# Patient Record
Sex: FEMALE | Race: WHITE | NOT HISPANIC OR LATINO | Employment: UNEMPLOYED | URBAN - NONMETROPOLITAN AREA
[De-identification: names, ages, dates, MRNs, and addresses within clinical notes are randomized per-mention and may not be internally consistent; named-entity substitution may affect disease eponyms.]

---

## 2018-08-13 ENCOUNTER — APPOINTMENT (EMERGENCY)
Dept: CT IMAGING | Facility: HOSPITAL | Age: 28
End: 2018-08-13
Payer: COMMERCIAL

## 2018-08-13 ENCOUNTER — HOSPITAL ENCOUNTER (EMERGENCY)
Facility: HOSPITAL | Age: 28
Discharge: HOME | End: 2018-08-13
Attending: EMERGENCY MEDICINE
Payer: COMMERCIAL

## 2018-08-13 VITALS
HEART RATE: 100 BPM | SYSTOLIC BLOOD PRESSURE: 132 MMHG | OXYGEN SATURATION: 100 % | WEIGHT: 171.3 LBS | TEMPERATURE: 97.2 F | RESPIRATION RATE: 18 BRPM | DIASTOLIC BLOOD PRESSURE: 75 MMHG

## 2018-08-13 DIAGNOSIS — R51.9 HEADACHE: ICD-10-CM

## 2018-08-13 DIAGNOSIS — K04.7 DENTAL ABSCESS: Primary | ICD-10-CM

## 2018-08-13 PROCEDURE — 99283 EMERGENCY DEPT VISIT LOW MDM: CPT

## 2018-08-13 PROCEDURE — 70450 CT HEAD/BRAIN W/O DYE: CPT

## 2018-08-13 RX ORDER — CLINDAMYCIN HYDROCHLORIDE 150 MG/1
300 CAPSULE ORAL ONCE
Status: COMPLETED | OUTPATIENT
Start: 2018-08-13 | End: 2018-08-13

## 2018-08-13 RX ORDER — ONDANSETRON 4 MG/1
4 TABLET, ORALLY DISINTEGRATING ORAL ONCE
Status: COMPLETED | OUTPATIENT
Start: 2018-08-13 | End: 2018-08-13

## 2018-08-13 RX ORDER — CLINDAMYCIN HYDROCHLORIDE 300 MG/1
300 CAPSULE ORAL
Qty: 28 CAPSULE | Refills: 0 | Status: SHIPPED | OUTPATIENT
Start: 2018-08-13 | End: 2018-08-20

## 2018-08-13 RX ORDER — NAPROXEN 250 MG/1
500 TABLET ORAL ONCE
Status: COMPLETED | OUTPATIENT
Start: 2018-08-13 | End: 2018-08-13

## 2018-08-13 RX ORDER — NAPROXEN 500 MG/1
500 TABLET ORAL EVERY 8 HOURS PRN
Qty: 20 TABLET | Refills: 0 | Status: SHIPPED | OUTPATIENT
Start: 2018-08-13

## 2018-08-13 RX ADMIN — ONDANSETRON 4 MG: 4 TABLET, ORALLY DISINTEGRATING ORAL at 15:39

## 2018-08-13 RX ADMIN — CLINDAMYCIN HYDROCHLORIDE 300 MG: 150 CAPSULE ORAL at 16:06

## 2018-08-13 RX ADMIN — NAPROXEN 500 MG: 250 TABLET ORAL at 16:06

## 2018-08-13 NOTE — ED PROVIDER NOTES
History  Chief Complaint   Patient presents with    Headache     pt had dental abcess few days ago which went down   now has headache and nausea     Pt gives hx of having "dental Abscess for few days" - has improved and decreased in size with no tx at home  Pt c/o continued discomfort and assoc nausea and HA on left side  Pt unsure about fevers  No difficulty swallowing or speaking  Tooth is decayed and broken- has not seen dentist          History provided by:  Patient  Headache   Pain location:  L temporal  Quality:  Sharp  Radiates to:  Does not radiate  Progression:  Waxing and waning  Chronicity:  New  Similar to prior headaches: no    Context: not stress, not exposure to cold air, not loud noise and not straining    Ineffective treatments:  NSAIDs  Associated symptoms: nausea and photophobia    Associated symptoms: no abdominal pain, no back pain, no blurred vision, no congestion, no cough, no diarrhea, no dizziness, no ear pain, no eye pain, no fever, no focal weakness, no hearing loss, no loss of balance, no near-syncope, no neck pain, no neck stiffness, no numbness, no paresthesias, no seizures, no sinus pressure, no sore throat, no swollen glands, no syncope, no tingling, no visual change and no vomiting    Dental Pain   Associated symptoms: headaches    Associated symptoms: no congestion, no drooling, no facial swelling, no fever and no neck pain        None       History reviewed  No pertinent past medical history  History reviewed  No pertinent surgical history  History reviewed  No pertinent family history  I have reviewed and agree with the history as documented  Social History   Substance Use Topics    Smoking status: Current Every Day Smoker    Smokeless tobacco: Never Used    Alcohol use Not on file      Comment: ocassional        Review of Systems   Constitutional: Negative  Negative for activity change, chills, diaphoresis and fever  HENT: Positive for dental problem  Negative for congestion, drooling, ear pain, facial swelling, hearing loss, sinus pressure, sore throat, trouble swallowing and voice change  Eyes: Positive for photophobia  Negative for blurred vision, pain, discharge and visual disturbance  Respiratory: Negative  Negative for cough, chest tightness, shortness of breath and stridor  Cardiovascular: Negative  Negative for chest pain, palpitations, syncope and near-syncope  Gastrointestinal: Positive for nausea  Negative for abdominal pain, diarrhea and vomiting  Genitourinary: Negative  Negative for difficulty urinating, dysuria, flank pain, hematuria and pelvic pain  Musculoskeletal: Negative  Negative for arthralgias, back pain, joint swelling, neck pain and neck stiffness  Skin: Negative  Negative for pallor, rash and wound  Allergic/Immunologic: Negative for immunocompromised state  Neurological: Positive for headaches  Negative for dizziness, tremors, focal weakness, seizures, speech difficulty, numbness, paresthesias and loss of balance  Psychiatric/Behavioral: Negative  All other systems reviewed and are negative  Physical Exam  Physical Exam   Constitutional: She is oriented to person, place, and time  She appears well-developed and well-nourished  She is active and cooperative  Non-toxic appearance  She does not have a sickly appearance  She does not appear ill  No distress  HENT:   Head: Normocephalic and atraumatic  Right Ear: Hearing, tympanic membrane and ear canal normal    Left Ear: Hearing, tympanic membrane and ear canal normal    Mouth/Throat: Oropharynx is clear and moist  Mucous membranes are not dry and not cyanotic  Dental abscesses and dental caries present  No oropharyngeal exudate, posterior oropharyngeal edema or posterior oropharyngeal erythema  Eyes: Conjunctivae and EOM are normal  Pupils are equal, round, and reactive to light  Neck: Normal range of motion  Neck supple  No JVD present   No spinous process tenderness and no muscular tenderness present  Cardiovascular: Normal rate, regular rhythm, intact distal pulses and normal pulses  No extrasystoles are present  Pulmonary/Chest: Effort normal  No stridor  No respiratory distress  She has no wheezes  She has no rhonchi  She has no rales  Abdominal: Soft  Bowel sounds are normal  There is no tenderness  There is no rigidity, no guarding and no CVA tenderness  Neurological: She is alert and oriented to person, place, and time  She has normal strength and normal reflexes  No cranial nerve deficit  Skin: Skin is warm and dry  No petechiae and no rash noted  She is not diaphoretic  No cyanosis  No pallor  Psychiatric: She has a normal mood and affect  Her speech is normal and behavior is normal  Thought content normal  Cognition and memory are normal    Vitals reviewed  Vital Signs  ED Triage Vitals [08/13/18 1517]   Temperature Pulse Respirations Blood Pressure SpO2   (!) 97 2 °F (36 2 °C) 100 18 132/75 100 %      Temp Source Heart Rate Source Patient Position - Orthostatic VS BP Location FiO2 (%)   Temporal Right Sitting Right arm --      Pain Score       6           Vitals:    08/13/18 1517   BP: 132/75   Pulse: 100   Patient Position - Orthostatic VS: Sitting       Visual Acuity      ED Medications  Medications   ondansetron (ZOFRAN-ODT) dispersible tablet 4 mg (4 mg Oral Given 8/13/18 1539)   naproxen (NAPROSYN) tablet 500 mg (500 mg Oral Given 8/13/18 1606)   clindamycin (CLEOCIN) capsule 300 mg (300 mg Oral Given 8/13/18 1606)       Diagnostic Studies  Results Reviewed     None                 CT head without contrast   Final Result by Kelin Andrade DO (08/13 1553)   No acute intracranial abnormality  Please note: The teeth were not imaged              Workstation performed: [id number]                    Procedures  Procedures       Phone Contacts  ED Phone Contact    ED Course  ED Course as of Aug 14 2338   Mon Aug 13, 2018   1555 CT with no acute findings    1556 Discussed dental abscess and need for dental care - pt states will call today                                MDM  CritCare Time    Disposition  Final diagnoses:   Dental abscess   Headache     Time reflects when diagnosis was documented in both MDM as applicable and the Disposition within this note     Time User Action Codes Description Comment    8/13/2018  3:57 PM Alfa Luda Add [K04 7] Dental abscess     8/13/2018  3:57 PM Alfa Lares Add [R51] Headache       ED Disposition     ED Disposition Condition Comment    Discharge  Inell Kermit discharge to home/self care  Condition at discharge: Stable        Follow-up Information    None         Discharge Medication List as of 8/13/2018  4:02 PM      START taking these medications    Details   clindamycin (CLEOCIN) 300 MG capsule Take 1 capsule (300 mg total) by mouth 4 (four) times a day for 7 days, Starting Mon 8/13/2018, Until Mon 8/20/2018, Print      naproxen (NAPROSYN) 500 mg tablet Take 1 tablet (500 mg total) by mouth every 8 (eight) hours as needed for moderate pain, Starting Mon 8/13/2018, Print           No discharge procedures on file      ED Provider  Electronically Signed by           Naeem Wright DO  08/14/18 0268

## 2018-08-13 NOTE — DISCHARGE INSTRUCTIONS
Need to follow with dentist as discussed   Take medications as prescribed         Acute Headache   AMBULATORY CARE:   An acute headache  is pain or discomfort that starts suddenly and gets worse quickly  You may have an acute headache only when you feel stress or eat certain foods  Other acute headache pain can happen every day, and sometimes several times a day  The cause of an acute headache may not be known  It may be triggered by stress, fatigue, hormones, food, or trauma  Common types of acute headache:   · Tension headache  is the most common type of headache  These headaches typically occur in the late afternoon and go away by evening  The pain is usually mild or moderate  You may have problems tolerating bright light or loud noise  The pain is usually across the forehead or in the back of the head, often only on one side  These headaches may occur every day  · Migraine headaches  cause moderate or severe pain  The headache generally lasts from 1 to 3 days and tends to come back  Pain is usually on only one side, but it may change sides  Migraines often occur in the temple, the back of the head, or behind the eye  The pain may throb or be sharp and steady  · A migraine with aura  means you see or feel something before a migraine  You may see a small spot surrounded by bright zigzag lines  Other signs or symptoms may follow the aura  · Cluster headache  pain is usually only on one side  It often causes severe pain, and can last for 30 minutes to 2 hours  These headaches may occur 1 or 2 times each day, more often at night  The pain may wake you  Seek care immediately if:   · You have severe pain  · You have numbness or weakness on one side of your face or body  · You have a headache that occurs after a blow to the head, a fall, or other trauma  · You have a headache, are forgetful or confused, or have trouble speaking  · You have a headache, stiff neck, and a fever    Contact your healthcare provider if:   · You have a constant headache and are vomiting  · You have a headache each day that does not get better, even after treatment  · You have changes in your headaches, or new symptoms that occur when you have a headache  · You have questions or concerns about your condition or care  Treatment:   · Medicine  may be given to decrease pain  The medicine your healthcare provider recommends will depend on the kind of headaches you have  You will need to take prescription headache medicines as directed to prevent a problem called rebound headache  These headaches happen with regular use of pain relievers for headache disorders  NSAIDs or acetaminophen may help some kinds of headaches  · Biofeedback  may help you learn how to change stress reactions  For example, you learn to slow your heart rate when you become upset  You may also learn to prevent certain headaches by combining heat with relaxation  · Cognitive behavior therapy,  or stress management, may be used with other therapies to prevent headaches  Manage your symptoms:   · Apply heat or ice  on the headache area  Use a heat or ice pack  For an ice pack, you can also put crushed ice in a plastic bag  Cover the pack or bag with a towel before you apply it to your skin  Ice and heat both help decrease pain, and heat helps decrease muscle spasms  Apply heat for 20 to 30 minutes every 2 hours  Apply ice for 15 to 20 minutes every hour  Apply heat or ice for as long and for as many days as directed  You may alternate heat and ice  · Relax your muscles  Lie down in a comfortable position and close your eyes  Relax your muscles slowly  Start at your toes and work your way up your body  · Keep a record of your headaches  Write down when your headaches start and stop  Include your symptoms and what you were doing when the headache began  Record what you ate or drank for 24 hours before the headache started   Describe the pain and where it hurts  Keep track of what you did to treat your headache and if it worked  Prevent an acute headache:   · Avoid anything that triggers an acute headache  Examples include exposure to chemicals, going to high altitude, or not getting enough sleep  Create a regular sleep routine  Go to sleep at the same time and wake up at the same time each day  Do not use electronic devices before bedtime  These may trigger a headache or prevent you from sleeping well  · Do not smoke  Nicotine and other chemicals in cigarettes and cigars can trigger an acute headache or make it worse  Ask your healthcare provider for information if you currently smoke and need help to quit  E-cigarettes or smokeless tobacco still contain nicotine  Talk to your healthcare provider before you use these products  · Limit alcohol as directed  Alcohol can trigger an acute headache or make it worse  If you have cluster headaches, do not drink alcohol during an episode  For other types of headaches, ask your healthcare provider if it is safe for you to drink alcohol  Ask how much is safe for you to drink, and how often  · Exercise as directed  Exercise can reduce tension and help with headache pain  Aim for 30 minutes of physical activity on most days of the week  Your healthcare provider can help you create an exercise plan  · Eat a variety of healthy foods  Healthy foods include fruits, vegetables, low-fat dairy products, lean meats, fish, whole grains, and cooked beans  Your healthcare provider or dietitian can help you create meals plans if you need to avoid foods that trigger headaches  Follow up with your healthcare provider as directed:  Bring your headache record with you when you see your healthcare provider  Write down your questions so you remember to ask them during your visits    © 2017 [employer] Information is for End User's use only and may not be sold, redistributed or otherwise used for commercial purposes  All illustrations and images included in CareNotes® are the copyrighted property of [employer]  or [employer]  The above information is an  only  It is not intended as medical advice for individual conditions or treatments  Talk to your doctor, nurse or pharmacist before following any medical regimen to see if it is safe and effective for you  Dental Abscess   AMBULATORY CARE:   A dental abscess  is a collection of pus in or around a tooth  A dental abscess is caused by bacteria  The bacteria usually enter the tooth when the enamel (outer part of the tooth) is damaged by tooth decay  Bacteria may also enter the tooth through a break or chip in the tooth, or a cut in the gum  Food particles that are stuck between the teeth for a long time may also lead to an abscess  Signs and symptoms of a dental abscess:   · Toothache, a loose tooth, or a tooth that is very sensitive to pressure or temperature    · Bad breath, unpleasant taste, and drooling    · Fever    · Pain, redness, and swelling of the gums, or swelling of your face and neck    · Pain when you open or close your mouth    · Trouble opening your mouth  Seek care immediately if:   · You have severe pain  · You have trouble breathing because of pain or swelling  Contact your healthcare provider if:   · Your symptoms get worse, even after treatment  · Your mouth is bleeding  · You cannot eat or drink because of pain or swelling  · Your abscess returns  · You have an injury that causes a crack in your tooth  · You have questions or concerns about your condition or care  Treatment:  You may  need any of the following:  · Medicines  may be given to treat a bacterial infection and decrease pain  · Incision and drainage  is a cut in the abscess to allow the pus to drain  A sample of fluid may be collected from your abscess  The fluid is sent to a lab and tested for bacteria   Ask your healthcare provider for more information  · A root canal  is a procedure to remove the bacteria and prevent more infection  It is usually done after an incision and drainage  A filling or crown will be placed over the tooth after you have healed from your root canal      · Tooth removal  may be needed if the infection affects deeper tissues  This is usually done after an incision and drainage  Self-care:   · Rinse your mouth every 2 hours with salt water  This will help keep the area clean  · Gently brush your teeth twice a day with a soft tooth brush  This will help keep the area clean  · Eat soft foods as directed  Soft foods may cause less pain  Examples include applesauce, yogurt, and cooked pasta  Ask your healthcare provider how long to follow this instruction  · Apply a warm compress to your tooth or gum  Use a cotton ball or gauze soaked in warm water  Remove the compress in 10 minutes or when it becomes cool  Repeat 3 times a day  Prevent another abscess:   · Brush your teeth at least 2 times a day with fluoride toothpaste  · Use dental floss to clean between your teeth at least once a day  · Rinse your mouth with water or mouthwash after meals and snacks  · Chew sugarless gum after meals and snacks  · Limit foods that are sticky and high in sugar such as raisons  Also limit drinks high in sugar, such as soda  · See your dentist every 6 months for dental cleanings and oral exams  Follow up with your healthcare provider in 24 hours: Your healthcare provider will need to check your teeth and gums  Write down your questions so you remember to ask them during your visits  © 2017 [employer] Information is for End User's use only and may not be sold, redistributed or otherwise used for commercial purposes  All illustrations and images included in CareNotes® are the copyrighted property of [employer]  or [employer]    The above information is an  only  It is not intended as medical advice for individual conditions or treatments  Talk to your doctor, nurse or pharmacist before following any medical regimen to see if it is safe and effective for you